# Patient Record
Sex: MALE | Race: WHITE
[De-identification: names, ages, dates, MRNs, and addresses within clinical notes are randomized per-mention and may not be internally consistent; named-entity substitution may affect disease eponyms.]

---

## 2022-10-13 ENCOUNTER — NURSE TRIAGE (OUTPATIENT)
Dept: OTHER | Facility: CLINIC | Age: 37
End: 2022-10-13

## 2022-10-13 NOTE — TELEPHONE ENCOUNTER
Location of patient: [address] [facility] [address] [facility] [address] solorzano from [facility] with [employer]. Subjective: Caller states \"I have a sensitive stomach. Starting yesterday I had some bleeding with bowel movements. \"     Current Symptoms: bright red blood on toilet tissue and in water    Soft stool    States went to urgent care last week for frequent urination - states \"all the tests were negative\" - was given doxycycline    Onset: 1 days ago;       Pain Severity: 2/10    Temperature:  denies    Denies - black or tarry stool / vomiting / rectal trauma     What has been tried: probiotics      Recommended disposition: See in Office Today    Patient has a new patient appointment scheduled for 10/21/22 and would like the appointment moved to today. Patient advised to follow up with an [facility] for current problem if appointment cannot be moved to today. Care advice provided, patient verbalizes understanding; denies any other questions or concerns; instructed to call back for any new or worsening symptoms. Patient/Caller agrees with recommended disposition; writer provided warm transfer to [facility] at [facility] for appointment scheduling    Attention Provider: Thank you for allowing me to participate in the care of your patient. The patient was connected to triage in response to information provided to the [facility]. Please do not respond through this encounter as the response is not directed to a shared pool.         Reason for Disposition   Patient wants to be seen    Protocols used: Rectal Bleeding-ADULT-[place]

## 2022-10-21 ENCOUNTER — OFFICE VISIT (OUTPATIENT)
Dept: FAMILY MEDICINE CLINIC | Age: 37
End: 2022-10-21
Payer: MEDICAID

## 2022-10-21 VITALS
SYSTOLIC BLOOD PRESSURE: 126 MMHG | HEIGHT: 69 IN | OXYGEN SATURATION: 100 % | DIASTOLIC BLOOD PRESSURE: 76 MMHG | TEMPERATURE: 98.4 F | BODY MASS INDEX: 24.88 KG/M2 | HEART RATE: 60 BPM | WEIGHT: 168 LBS

## 2022-10-21 DIAGNOSIS — G44.219 EPISODIC TENSION-TYPE HEADACHE, NOT INTRACTABLE: ICD-10-CM

## 2022-10-21 DIAGNOSIS — R35.0 URINARY FREQUENCY: Primary | ICD-10-CM

## 2022-10-21 DIAGNOSIS — K62.5 BRIGHT RED BLOOD PER RECTUM: ICD-10-CM

## 2022-10-21 DIAGNOSIS — D18.01 CHERRY ANGIOMA: ICD-10-CM

## 2022-10-21 DIAGNOSIS — R07.9 CHEST PAIN, UNSPECIFIED TYPE: ICD-10-CM

## 2022-10-21 PROCEDURE — 99203 OFFICE O/P NEW LOW 30 MIN: CPT

## 2022-10-21 NOTE — PROGRESS NOTES
Chief Complaint   Patient presents with    Establish Care    Physical    Labs     Visit Vitals  /76 (BP 1 Location: Left upper arm, BP Patient Position: Sitting, BP Cuff Size: Adult)   Pulse 60   Temp 98.4 °F (36.9 °C) (Temporal)   Ht 5' 9.29\" (1.76 m)   Wt 168 lb (76.2 kg)   SpO2 100%   BMI 24.60 kg/m²

## 2022-10-21 NOTE — PROGRESS NOTES
Wang White is an 44 y.o. male who presents for a new patient visit with multiple acute complaints. Patient reports urinary frequency over the past few weeks. He reports urination every couple of hours. He went to [facility] 2 weeks ago, UA, CBC, STI results were normal. His symptoms persisted, and he returned to [facility]. He was prescribed doxycycline after complaining of some associated scrotal pain. He reports moderate improvement in the last few days, but reports that he is still using the restroom every 3 hours. Denies dysuria, hematuria, fever, chills, flank pain, urgency, hesitancy. The patient is also reporting some increased diffuse skin itchiness as well over the past few weeks. He states that this has improved over the last few days. The patient reports an episode of bright red blood per rectum last week. Had another episode the day after. He reports blood present on the tissue paper after wiping. He denies rectal pain. He was evaluated at [facility] for these episodes, and he was given procto-foam. He has had no episodes since. He has called GI and has an appointment in Dec.     He reports intermittent mild headaches. These are left retro-orbital and resolve with sleep/rest. They are not present when waking in the morning. No change in quality in the headaches. The patient works at a desk during the day. The patient reports a rash described as discrete small red spots scattered over his skin. Denies itching. Lastly, the patient reports some intermittent substernal chest pains. These pains last anywhere from 1-5minutes. They are non-exertional and resolve spontaneously. He denies associated numbness, tingling, palpitations. He has been evaluated by two prior providers for the same with EKGs at that time reportedly normal.    Allergies - reviewed:   Not on File      Medications - reviewed:   No current outpatient medications on file.      No current facility-administered medications for this visit. Past Medical History - reviewed:  No past medical history on file. Past Surgical History - reviewed:   No past surgical history on file. Social History - reviewed:  Social History     Socioeconomic History    Marital status: SINGLE     Spouse name: Not on file    Number of children: Not on file    Years of education: Not on file    Highest education level: Not on file   Occupational History    Not on file   Tobacco Use    Smoking status: Never    Smokeless tobacco: Never   Vaping Use    Vaping Use: Not on file   Substance and Sexual Activity    Alcohol use: Yes     Alcohol/week: 2.0 standard drinks     Types: 1 Glasses of wine, 1 Cans of beer per week    Drug use: Never    Sexual activity: Yes     Partners: Female     Birth control/protection: None   Other Topics Concern    Not on file   Social History Narrative    Not on file     Social Determinants of Health     Financial Resource Strain: Not on file   Food Insecurity: Not on file   Transportation Needs: Not on file   Physical Activity: Not on file   Stress: Not on file   Social Connections: Not on file   Intimate Partner Violence: Not on file   Housing Stability: Not on file        ROS  Review of Systems   Constitutional:  Negative for chills, fever, malaise/fatigue and weight loss. Eyes:  Negative for blurred vision. Respiratory:  Negative for cough and shortness of breath. Cardiovascular:  Positive for chest pain. Negative for palpitations. Gastrointestinal:  Positive for blood in stool (resolved). Negative for abdominal pain, nausea and vomiting. Genitourinary:  Positive for frequency. Negative for dysuria, flank pain, hematuria and urgency. Skin:  Positive for rash. Neurological:  Positive for headaches. Negative for weakness. Endo/Heme/Allergies:  Negative for polydipsia.         Physical Exam  Visit Vitals  /76 (BP 1 Location: Left upper arm, BP Patient Position: Sitting, BP Cuff Size: Adult)   Pulse 60   Temp 98.4 °F (36.9 °C) (Temporal)   Ht 5' 9.29\" (1.76 m)   Wt 168 lb (76.2 kg)   SpO2 100%   BMI 24.60 kg/m²       Vital signs reviewed  General appearance - alert, well appearing, and in no distress  Eyes - pupils equal and reactive, extraocular eye movements intact  Mouth - mucous membranes moist, pharynx normal without lesions  Neck - supple, no significant adenopathy  Chest - no respiratory distress, speaking in full sentences  Heart - normal rate, regular rhythm, normal S1, S2, no murmurs, rubs, clicks or gallops  Neurological - alert, oriented, normal speech, no focal findings or movement disorder noted  Extremities - peripheral pulses normal, no pedal edema, no clubbing or cyanosis  Skin - scattered cherry angiomas to skin      Assessment/Plan  1. Urinary frequency: prior work-up negative for infection. Possible epididymitis s/p doxy treatment. No nocturnal symptoms, low suspicion for BPH.  - Discussed common bladder irritants including caffeine, alcohol.  - Discussed modifiable factors including moderating fluid intake    2. Bright red blood per rectum: acute, resolved. Likely secondary to hemorrhoids, anal fissure. - Monitor for recurrence  - has GI appointment scheduled  - Discussed return precautions including new or worsening symptoms    3. Cherry angiomas    4. Chest pain, unspecified type: Non-exertional with prior unremarkable work-up. Very low suspicion for cardiac etiology. May be musculoskeletal in etiology. - Continue to monitor    5. Episodic tension-type headache, not intractable: Likely associated with desk work  - Discussed neck exercises, stretches periodically  - may use OTC tylenol or ibuprofen as needed for prn relief. I have discussed the diagnosis with the patient and the intended plan as seen in the above orders. Patient verbalized understanding of the plan and agrees with the plan.  The patient has received an after-visit summary and questions were answered concerning future plans. I have discussed medication side effects and warnings with the patient as well. Informed patient to return to the office if new symptoms arise.         Campos Juaerz MD  Patient was seen and discussed with attending Dr. Aurelia Tony

## 2022-10-24 NOTE — PROGRESS NOTES
I saw and evaluated the patient, performing the key elements of the service. I discussed the findings, assessment and plan with the resident and agree with the resident's findings and plan as documented in the resident's note. All of the symptoms resolved, chest pain is not present today.

## 2022-11-22 ENCOUNTER — OFFICE VISIT (OUTPATIENT)
Dept: FAMILY MEDICINE CLINIC | Age: 37
End: 2022-11-22
Payer: MEDICAID

## 2022-11-22 VITALS
HEIGHT: 69 IN | SYSTOLIC BLOOD PRESSURE: 110 MMHG | HEART RATE: 55 BPM | OXYGEN SATURATION: 98 % | DIASTOLIC BLOOD PRESSURE: 65 MMHG | WEIGHT: 172 LBS | RESPIRATION RATE: 16 BRPM | BODY MASS INDEX: 25.48 KG/M2

## 2022-11-22 DIAGNOSIS — R35.0 URINARY FREQUENCY: ICD-10-CM

## 2022-11-22 DIAGNOSIS — L29.9 PRURITUS: Primary | ICD-10-CM

## 2022-11-22 DIAGNOSIS — K62.5 BRIGHT RED BLOOD PER RECTUM: ICD-10-CM

## 2022-11-22 PROCEDURE — 99213 OFFICE O/P EST LOW 20 MIN: CPT

## 2022-11-22 NOTE — PATIENT INSTRUCTIONS
- limit the length of hot showers. Pat yourself dry and trial some moisturizing ointments applied after patting yourself dry. - if the itching is keep you from sleep you may get relief with over the counter benadryl. Use this only at nights as it is sedating.

## 2022-11-22 NOTE — PROGRESS NOTES
Chief Complaint   Patient presents with    Follow-up     PT being seen to follow-up on urinary frequency; presents with improvements. Blood not present in stool      Visit Vitals  /65   Pulse (!) 55   Resp 16   Ht 5' 9.29\" (1.76 m)   Wt 172 lb (78 kg)   SpO2 98%   BMI 25.19 kg/m²     1. Have you been to the ER, urgent care clinic since your last visit? Hospitalized since your last visit? No    2. Have you seen or consulted any other health care providers outside of the [address] [place] [address] since your last visit? Include any pap smears or colon screening.  No

## 2024-02-05 ENCOUNTER — OFFICE VISIT (OUTPATIENT)
Age: 39
End: 2024-02-05
Payer: COMMERCIAL

## 2024-02-05 VITALS
SYSTOLIC BLOOD PRESSURE: 128 MMHG | OXYGEN SATURATION: 98 % | DIASTOLIC BLOOD PRESSURE: 84 MMHG | HEART RATE: 70 BPM | BODY MASS INDEX: 25.19 KG/M2 | WEIGHT: 172 LBS

## 2024-02-05 DIAGNOSIS — E66.3 OVERWEIGHT: ICD-10-CM

## 2024-02-05 DIAGNOSIS — Z00.00 ENCOUNTER FOR WELL ADULT EXAM WITHOUT ABNORMAL FINDINGS: ICD-10-CM

## 2024-02-05 DIAGNOSIS — K62.5 BRIGHT RED BLOOD PER RECTUM: Primary | ICD-10-CM

## 2024-02-05 DIAGNOSIS — Z80.42: ICD-10-CM

## 2024-02-05 DIAGNOSIS — Z13.1 SCREENING FOR DIABETES MELLITUS: ICD-10-CM

## 2024-02-05 DIAGNOSIS — Z13.220 SCREENING FOR LIPID DISORDERS: ICD-10-CM

## 2024-02-05 DIAGNOSIS — R35.0 URINARY FREQUENCY: ICD-10-CM

## 2024-02-05 DIAGNOSIS — K64.4 EXTERNAL HEMORRHOID: ICD-10-CM

## 2024-02-05 LAB
ALBUMIN SERPL-MCNC: 4.4 G/DL (ref 3.5–5)
ALBUMIN/GLOB SERPL: 1.4 (ref 1.1–2.2)
ALP SERPL-CCNC: 51 U/L (ref 45–117)
ALT SERPL-CCNC: 91 U/L (ref 12–78)
ANION GAP SERPL CALC-SCNC: 1 MMOL/L (ref 5–15)
APPEARANCE UR: ABNORMAL
AST SERPL-CCNC: 43 U/L (ref 15–37)
BACTERIA URNS QL MICRO: NEGATIVE /HPF
BILIRUB SERPL-MCNC: 0.8 MG/DL (ref 0.2–1)
BILIRUB UR QL: NEGATIVE
BUN SERPL-MCNC: 19 MG/DL (ref 6–20)
BUN/CREAT SERPL: 19 (ref 12–20)
CALCIUM SERPL-MCNC: 9.3 MG/DL (ref 8.5–10.1)
CHLORIDE SERPL-SCNC: 108 MMOL/L (ref 97–108)
CHOLEST SERPL-MCNC: 183 MG/DL
CO2 SERPL-SCNC: 30 MMOL/L (ref 21–32)
COLOR UR: ABNORMAL
CREAT SERPL-MCNC: 0.99 MG/DL (ref 0.7–1.3)
EPITH CASTS URNS QL MICRO: ABNORMAL /LPF
ERYTHROCYTE [DISTWIDTH] IN BLOOD BY AUTOMATED COUNT: 13.4 % (ref 11.5–14.5)
EST. AVERAGE GLUCOSE BLD GHB EST-MCNC: 111 MG/DL
GLOBULIN SER CALC-MCNC: 3.1 G/DL (ref 2–4)
GLUCOSE SERPL-MCNC: 105 MG/DL (ref 65–100)
GLUCOSE UR STRIP.AUTO-MCNC: NEGATIVE MG/DL
HBA1C MFR BLD: 5.5 % (ref 4–5.6)
HCT VFR BLD AUTO: 45.7 % (ref 36.6–50.3)
HDLC SERPL-MCNC: 72 MG/DL
HDLC SERPL: 2.5 (ref 0–5)
HGB BLD-MCNC: 14.6 G/DL (ref 12.1–17)
HGB UR QL STRIP: ABNORMAL
HYALINE CASTS URNS QL MICRO: ABNORMAL /LPF (ref 0–5)
KETONES UR QL STRIP.AUTO: NEGATIVE MG/DL
LDLC SERPL CALC-MCNC: 100.6 MG/DL (ref 0–100)
LEUKOCYTE ESTERASE UR QL STRIP.AUTO: NEGATIVE
MCH RBC QN AUTO: 27.2 PG (ref 26–34)
MCHC RBC AUTO-ENTMCNC: 31.9 G/DL (ref 30–36.5)
MCV RBC AUTO: 85.3 FL (ref 80–99)
NITRITE UR QL STRIP.AUTO: NEGATIVE
NRBC # BLD: 0 K/UL (ref 0–0.01)
NRBC BLD-RTO: 0 PER 100 WBC
PH UR STRIP: 5 (ref 5–8)
PLATELET # BLD AUTO: 205 K/UL (ref 150–400)
PMV BLD AUTO: 12.2 FL (ref 8.9–12.9)
POTASSIUM SERPL-SCNC: 4.2 MMOL/L (ref 3.5–5.1)
PROT SERPL-MCNC: 7.5 G/DL (ref 6.4–8.2)
PROT UR STRIP-MCNC: NEGATIVE MG/DL
RBC # BLD AUTO: 5.36 M/UL (ref 4.1–5.7)
RBC #/AREA URNS HPF: ABNORMAL /HPF (ref 0–5)
SODIUM SERPL-SCNC: 139 MMOL/L (ref 136–145)
SP GR UR REFRACTOMETRY: 1.02 (ref 1–1.03)
SPECIMEN HOLD: NORMAL
TRIGL SERPL-MCNC: 52 MG/DL
UROBILINOGEN UR QL STRIP.AUTO: 0.2 EU/DL (ref 0.2–1)
VLDLC SERPL CALC-MCNC: 10.4 MG/DL
WBC # BLD AUTO: 7.1 K/UL (ref 4.1–11.1)
WBC URNS QL MICRO: ABNORMAL /HPF (ref 0–4)

## 2024-02-05 PROCEDURE — 99213 OFFICE O/P EST LOW 20 MIN: CPT

## 2024-02-05 PROCEDURE — 99395 PREV VISIT EST AGE 18-39: CPT

## 2024-02-05 RX ORDER — LIDOCAINE HCL AND HYDROCORTISONE ACETATE 20; 20 MG/G; MG/G
1 CREAM RECTAL 2 TIMES DAILY PRN
Qty: 1 KIT | Refills: 0 | Status: SHIPPED | OUTPATIENT
Start: 2024-02-05

## 2024-02-05 SDOH — ECONOMIC STABILITY: HOUSING INSECURITY
IN THE LAST 12 MONTHS, WAS THERE A TIME WHEN YOU DID NOT HAVE A STEADY PLACE TO SLEEP OR SLEPT IN A SHELTER (INCLUDING NOW)?: NO

## 2024-02-05 SDOH — ECONOMIC STABILITY: FOOD INSECURITY: WITHIN THE PAST 12 MONTHS, YOU WORRIED THAT YOUR FOOD WOULD RUN OUT BEFORE YOU GOT MONEY TO BUY MORE.: NEVER TRUE

## 2024-02-05 SDOH — ECONOMIC STABILITY: INCOME INSECURITY: HOW HARD IS IT FOR YOU TO PAY FOR THE VERY BASICS LIKE FOOD, HOUSING, MEDICAL CARE, AND HEATING?: NOT HARD AT ALL

## 2024-02-05 SDOH — ECONOMIC STABILITY: FOOD INSECURITY: WITHIN THE PAST 12 MONTHS, THE FOOD YOU BOUGHT JUST DIDN'T LAST AND YOU DIDN'T HAVE MONEY TO GET MORE.: NEVER TRUE

## 2024-02-05 ASSESSMENT — PATIENT HEALTH QUESTIONNAIRE - PHQ9
SUM OF ALL RESPONSES TO PHQ QUESTIONS 1-9: 0
SUM OF ALL RESPONSES TO PHQ9 QUESTIONS 1 & 2: 0
SUM OF ALL RESPONSES TO PHQ QUESTIONS 1-9: 0
1. LITTLE INTEREST OR PLEASURE IN DOING THINGS: 0
2. FEELING DOWN, DEPRESSED OR HOPELESS: 0

## 2024-02-05 NOTE — PROGRESS NOTES
Well Adult Note  Name: Willie Pemberton Today’s Date: 2024   MRN: [id number] Sex: Male   Age: 38 y.o. Ethnicity: Non- / Non    : 1985 Race: White (non-)      Willie Pemberton is here for well adult exam.  History:  Bright red blood per rectum with h/o hemorrhoids  - has been using prep-H without relief  - present on the toilet tissue  - reports reports some rectal pain with bowel movements  - reports a rectal burning in the morning and while sitting. He describes this as an internal pain  - has been using a memory foam pad to sit on during work  - no constipation, diarrhea, or change in bowel habits  - typically has x2 soft bowel movements daily  - no weight loss, fever, chills, melena, nausea, vomiting, dysuria    Urinary frequency  - he continue to report urinary frequency throughout the day  - he does drink plenty of water as well caffeine  - no recurrence of dysuria    HM  - patient exercises routine  - reports varied, healthy diet    Father diagnosed with prostate cancer at age 64      Review of Systems   Constitutional:  Negative for activity change, appetite change, chills, fatigue and fever.   Respiratory:  Negative for cough and shortness of breath.    Cardiovascular:  Negative for chest pain and palpitations.   Gastrointestinal:  Positive for blood in stool and rectal pain. Negative for abdominal pain, diarrhea, nausea and vomiting.   Endocrine: Negative for polydipsia.   Genitourinary:  Positive for frequency. Negative for dysuria.   Allergic/Immunologic: Negative for immunocompromised state.   Neurological:  Negative for weakness and numbness.       No Known Allergies      Prior to Visit Medications    Medication Sig Taking? Authorizing Provider   Lidocaine-Hydrocortisone Ace (JANINA-HILL) 2-2 % KIT Place 1 each rectally 2 times daily as needed (pain, itching) . Do not use for longer than x1 week without taking a two day break from use. Yes Zack Craig MD       History

## 2024-02-05 NOTE — PROGRESS NOTES
Chief Complaint   Patient presents with    Annual Exam     Vitals:    02/05/24 0827   BP: 128/84   Pulse: 70   SpO2: 98%

## 2024-02-06 PROBLEM — Z80.42: Status: ACTIVE | Noted: 2024-02-06

## 2024-02-06 PROBLEM — K64.4 EXTERNAL HEMORRHOID: Status: ACTIVE | Noted: 2024-02-06

## 2024-02-06 ASSESSMENT — ENCOUNTER SYMPTOMS
ABDOMINAL PAIN: 0
NAUSEA: 0
RECTAL PAIN: 1
COUGH: 0
BLOOD IN STOOL: 1
DIARRHEA: 0
VOMITING: 0
SHORTNESS OF BREATH: 0

## 2024-02-06 NOTE — PROGRESS NOTES
Well Adult Note  Name: Willie Pemberton Today’s Date: 2024   MRN: [id number] Sex: Male   Age: 38 y.o. Ethnicity: Non- / Non    : 1985 Race: White (non-)      Willie Pemberton is here for well adult exam.  History:  ***    Review of Systems    No Known Allergies      Prior to Visit Medications    Medication Sig Taking? Authorizing Provider   Lidocaine-Hydrocortisone Ace (JANINA-HILL) 2-2 % KIT Place 1 each rectally 2 times daily as needed (pain, itching) . Do not use for longer than x1 week without taking a two day break from use. Yes Zack Craig MD       History reviewed. No pertinent past medical history.    History reviewed. No pertinent surgical history.      Family History   Problem Relation Age of Onset   • Prostate Cancer Father        Social History     Tobacco Use   • Smoking status: Never   • Smokeless tobacco: Never   Substance Use Topics   • Alcohol use: Yes     Alcohol/week: 2.0 standard drinks of alcohol     Types: 1 Glasses of wine, 1 Cans of beer per week   • Drug use: Never       Objective     Vital Signs  /84 (Site: Right Upper Arm, Position: Sitting, Cuff Size: Medium Adult)   Pulse 70   Wt 78 kg (172 lb)   SpO2 98%   BMI 25.19 kg/m²   Wt Readings from Last 3 Encounters:   24 78 kg (172 lb)   22 78 kg (172 lb)   10/21/22 76.2 kg (168 lb)       Waist Circumference  There were no vitals filed for this visit.    Physical Exam    Assessment   Plan   1. Bright red blood per rectum  -     CBC; Future  -     ND OFFICE/OUTPATIENT ESTABLISHED LOW MDM 20-29 MIN  2. External hemorrhoid  -     CBC; Future  -     Lidocaine-Hydrocortisone Ace (JANINA-HILL) 2-2 % KIT; Place 1 each rectally 2 times daily as needed (pain, itching) . Do not use for longer than x1 week without taking a two day break from use., Disp-1 kit, R-0Normal  -     ND OFFICE/OUTPATIENT ESTABLISHED LOW MDM 20-29 MIN  3. Urinary frequency  -     Hemoglobin A1C; Future  -     Urinalysis with

## 2024-02-07 ENCOUNTER — TELEPHONE (OUTPATIENT)
Age: 39
End: 2024-02-07

## 2024-02-07 DIAGNOSIS — R74.8 ELEVATED LIVER ENZYMES: Primary | ICD-10-CM

## 2024-02-07 LAB
PSA SERPL-MCNC: 0.4 NG/ML (ref 0–4)
REFLEX CRITERIA: NORMAL

## 2024-02-07 NOTE — PROGRESS NOTES
Order placed for repeat CMP to be completed at lab appointment for elevated liver enzymes.    Zack Craig MD

## 2024-02-07 NOTE — TELEPHONE ENCOUNTER
Oralia Keating, Pharmacist for [facility], called on behalf of pt. She stated that the cost of Lidocaine-Hydrocortisone Ace (JANINA-HILL) 2-2 % KIT is over $200, which the pt stated was too much. She suggested Hydrocortisone rectal cream 2.5 %, which is $10 with pt's insurance.    Thank you

## 2024-02-09 NOTE — TELEPHONE ENCOUNTER
Called and gave verbal order with same directions to pharmacist. Positive read back performed by pharmacist.

## 2024-02-09 NOTE — TELEPHONE ENCOUNTER
Spoke with patient who confirmed name and .    Patient notified that updated prescription has been called in to pharmacy.    Patient agreed and voiced understanding.

## 2024-02-09 NOTE — TELEPHONE ENCOUNTER
Pt called to check the status of alternative medication. Due to Peace's absence, can you please assist with this?    Thank you

## 2024-03-03 NOTE — PROGRESS NOTES
Subjective  Earlean Single is an 44 y.o. male who presents for follow-up. Urinary frequency:  - reports improved since last visit. - no dysuria, hematuria, abdominal pain, fever, chills  - No intervention or medication changes per patient    BRBPR:  - Blood in stool resolved. - Patient feels that his symptoms were likely secondary to hemorrhoids.  - He reports some intermittent mild pain preceding bowel movement. This pain is relieved with Preparation H use. - The patient has an upcoming GI appointment scheduled in December. Generalized pruritis:   - Itching to skin. Onset over the last few months. - denies rash  - denies new soap, detergent, or environmental exposures    Allergies - reviewed:   Not on File      Medications - reviewed:   No current outpatient medications on file. No current facility-administered medications for this visit. Past Medical History - reviewed:  No past medical history on file. Past Surgical History - reviewed:   No past surgical history on file. Social History - reviewed:  Social History     Socioeconomic History    Marital status: SINGLE     Spouse name: Not on file    Number of children: Not on file    Years of education: Not on file    Highest education level: Not on file   Occupational History    Not on file   Tobacco Use    Smoking status: Never    Smokeless tobacco: Never   Vaping Use    Vaping Use: Not on file   Substance and Sexual Activity    Alcohol use:  Yes     Alcohol/week: 2.0 standard drinks     Types: 1 Glasses of wine, 1 Cans of beer per week    Drug use: Never    Sexual activity: Yes     Partners: Female     Birth control/protection: None   Other Topics Concern    Not on file   Social History Narrative    Not on file     Social Determinants of Health     Financial Resource Strain: Not on file   Food Insecurity: Not on file   Transportation Needs: Not on file   Physical Activity: Not on file   Stress: Not on file   Social Connections: Not on file   Intimate Partner Violence: Not on file   Housing Stability: Not on file        ROS  Review of Systems   Constitutional:  Negative for chills, fever and malaise/fatigue. Respiratory:  Negative for cough and shortness of breath. Gastrointestinal:  Positive for blood in stool (resolved). Negative for abdominal pain, melena, nausea and vomiting. Genitourinary:  Positive for frequency (resolved). Negative for dysuria. Skin:  Positive for itching. Negative for rash. Neurological:  Negative for weakness and headaches. Physical Exam  Visit Vitals  /65   Pulse (!) 55   Resp 16   Ht 5' 9.29\" (1.76 m)   Wt 172 lb (78 kg)   SpO2 98%   BMI 25.19 kg/m²       Vital signs reviewed  General appearance - alert, well appearing, and in no distress  Eyes - pupils equal and reactive, extraocular eye movements intact  Ears - bilateral TM's and external ear canals normal  Nose - normal and patent, no erythema, discharge or polyps  Mouth - mucous membranes moist, pharynx normal without lesions  Neck - supple, no significant adenopathy  Chest - clear to auscultation, no wheezes, rales or rhonchi, symmetric air entry  Heart - normal rate, regular rhythm, normal S1, S2, no murmurs, rubs, clicks or gallops  Abdomen - soft, nontender, nondistended, no masses or organomegaly  Neurological - alert, oriented, normal speech, no focal findings or movement disorder noted  Extremities - peripheral pulses normal, no pedal edema, no clubbing or cyanosis  Skin - scattered cherry angiomas      Assessment/Plan  1. Pruritus: chronic, stable. No evidence of rash on exam. Potential etiology includes environmental allergen vs dry skin  - recommended limiting exposure to hot water. Apply hydrating/barrier cream or ointment to problem areas after showering.  - may trial benadryl at night for worsening symptoms. 2. Bright red blood per rectum: intermittent, resolved. Likely secondary to hemorrhoids.   - GI follow-up scheduled for further evaluation    3. Urinary frequency: resolved  - return to clinic if symptoms return. Follow-up and Dispositions    Return in about 1 year (around 11/22/2023), or if symptoms worsen or fail to improve, for annual physical.           I have discussed the diagnosis with the patient and the intended plan as seen in the above orders. Patient verbalized understanding of the plan and agrees with the plan. The patient has received an after-visit summary and questions were answered concerning future plans. I have discussed medication side effects and warnings with the patient as well. Informed patient to return to the office if new symptoms arise.         Chio Durant MD  Patient was seen and discussed with attending Dr. Metz Reasons [Feeling Fatigued] : feeling fatigued [Fever] : no fever [Earache] : no earache [Chills] : no chills [SOB] : no shortness of breath [Discharge From Ears] : no discharge from the ears [Sore Throat] : no sore throat [Leg Claudication] : no intermittent leg claudication [Cough] : no cough [Wheezing] : no wheezing [Abdominal Pain] : no abdominal pain [Change in Appetite] : no change in appetite [Nausea] : no nausea [Constipation] : no constipation [Change In The Stool] : no change in stool [FreeTextEntry3] : Wears glasses

## 2024-03-04 ENCOUNTER — NURSE ONLY (OUTPATIENT)
Age: 39
End: 2024-03-04

## 2024-03-04 DIAGNOSIS — R74.8 ELEVATED LIVER ENZYMES: ICD-10-CM

## 2024-03-04 LAB
ALBUMIN SERPL-MCNC: 4 G/DL (ref 3.5–5)
ALBUMIN/GLOB SERPL: 1.3 (ref 1.1–2.2)
ALP SERPL-CCNC: 47 U/L (ref 45–117)
ALT SERPL-CCNC: 40 U/L (ref 12–78)
ANION GAP SERPL CALC-SCNC: 4 MMOL/L (ref 5–15)
AST SERPL-CCNC: 22 U/L (ref 15–37)
BILIRUB SERPL-MCNC: 0.8 MG/DL (ref 0.2–1)
BUN SERPL-MCNC: 16 MG/DL (ref 6–20)
BUN/CREAT SERPL: 12 (ref 12–20)
CALCIUM SERPL-MCNC: 9.2 MG/DL (ref 8.5–10.1)
CHLORIDE SERPL-SCNC: 106 MMOL/L (ref 97–108)
CO2 SERPL-SCNC: 30 MMOL/L (ref 21–32)
CREAT SERPL-MCNC: 1.3 MG/DL (ref 0.7–1.3)
GLOBULIN SER CALC-MCNC: 3.1 G/DL (ref 2–4)
GLUCOSE SERPL-MCNC: 89 MG/DL (ref 65–100)
POTASSIUM SERPL-SCNC: 4.4 MMOL/L (ref 3.5–5.1)
PROT SERPL-MCNC: 7.1 G/DL (ref 6.4–8.2)
SODIUM SERPL-SCNC: 140 MMOL/L (ref 136–145)

## 2025-05-27 ENCOUNTER — TELEPHONE (OUTPATIENT)
Age: 40
End: 2025-05-27

## 2025-05-27 DIAGNOSIS — R74.8 ELEVATED LIVER ENZYMES: Primary | ICD-10-CM

## 2025-05-27 DIAGNOSIS — Z13.1 SCREENING FOR DIABETES MELLITUS: ICD-10-CM

## 2025-05-27 DIAGNOSIS — Z13.220 SCREENING FOR LIPID DISORDERS: ICD-10-CM

## 2025-05-27 DIAGNOSIS — E78.00 ELEVATED LDL CHOLESTEROL LEVEL: ICD-10-CM

## 2025-05-27 DIAGNOSIS — E66.3 OVERWEIGHT: ICD-10-CM

## 2025-05-27 NOTE — TELEPHONE ENCOUNTER
Pt is scheduled on 5/29 for a physical but would like to complete labs, prior to appt. Please advise if you will upload lab orders.

## 2025-05-28 NOTE — TELEPHONE ENCOUNTER
I called and spoke with the patient, he requested an early lab appointment for tomorrow and he wanted to keep his afternoon appointment with the Doctor.  Lab appt scheduled, understands to be fasting 8 hours prior to his appointment.

## 2025-05-29 ENCOUNTER — LAB (OUTPATIENT)
Age: 40
End: 2025-05-29

## 2025-05-29 ENCOUNTER — OFFICE VISIT (OUTPATIENT)
Age: 40
End: 2025-05-29
Payer: COMMERCIAL

## 2025-05-29 VITALS
OXYGEN SATURATION: 95 % | HEART RATE: 60 BPM | HEIGHT: 69 IN | WEIGHT: 179 LBS | BODY MASS INDEX: 26.51 KG/M2 | SYSTOLIC BLOOD PRESSURE: 104 MMHG | TEMPERATURE: 98.7 F | DIASTOLIC BLOOD PRESSURE: 50 MMHG

## 2025-05-29 DIAGNOSIS — E78.00 ELEVATED LDL CHOLESTEROL LEVEL: ICD-10-CM

## 2025-05-29 DIAGNOSIS — E66.3 OVERWEIGHT: ICD-10-CM

## 2025-05-29 DIAGNOSIS — R74.8 ELEVATED LIVER ENZYMES: ICD-10-CM

## 2025-05-29 DIAGNOSIS — Z13.220 SCREENING FOR LIPID DISORDERS: ICD-10-CM

## 2025-05-29 DIAGNOSIS — Z13.1 SCREENING FOR DIABETES MELLITUS: ICD-10-CM

## 2025-05-29 DIAGNOSIS — Z00.00 ENCOUNTER FOR WELL ADULT EXAM WITHOUT ABNORMAL FINDINGS: Primary | ICD-10-CM

## 2025-05-29 PROCEDURE — 99396 PREV VISIT EST AGE 40-64: CPT

## 2025-05-29 SDOH — ECONOMIC STABILITY: FOOD INSECURITY: WITHIN THE PAST 12 MONTHS, THE FOOD YOU BOUGHT JUST DIDN'T LAST AND YOU DIDN'T HAVE MONEY TO GET MORE.: NEVER TRUE

## 2025-05-29 SDOH — ECONOMIC STABILITY: FOOD INSECURITY: WITHIN THE PAST 12 MONTHS, YOU WORRIED THAT YOUR FOOD WOULD RUN OUT BEFORE YOU GOT MONEY TO BUY MORE.: NEVER TRUE

## 2025-05-29 ASSESSMENT — PATIENT HEALTH QUESTIONNAIRE - PHQ9
SUM OF ALL RESPONSES TO PHQ QUESTIONS 1-9: 0
2. FEELING DOWN, DEPRESSED OR HOPELESS: NOT AT ALL
1. LITTLE INTEREST OR PLEASURE IN DOING THINGS: NOT AT ALL

## 2025-05-29 ASSESSMENT — ENCOUNTER SYMPTOMS
ABDOMINAL PAIN: 0
COUGH: 0
SHORTNESS OF BREATH: 0

## 2025-05-29 NOTE — PATIENT INSTRUCTIONS
Well Visit, Ages 18 to 65: Care Instructions  Well visits can help you stay healthy. Your doctor has checked your overall health and may have suggested ways to take good care of yourself. Your doctor also may have recommended tests. You can help prevent illness with healthy eating, good sleep, vaccinations, regular exercise, and other steps.    Get the tests that you and your doctor decide on. Depending on your age and risks, examples might include screening for diabetes; hepatitis C; HIV; and cervical, breast, lung, and colon cancer. Screening helps find diseases before any symptoms appear.   Eat healthy foods. Choose fruits, vegetables, whole grains, lean protein, and low-fat dairy foods. Limit saturated fat and reduce salt.     Limit alcohol. Men should have no more than 2 drinks a day. Women should have no more than 1. For some people, no alcohol is the best choice.   Exercise. Get at least 30 minutes of exercise on most days of the week. Walking can be a good choice.     Reach and stay at your healthy weight. This will lower your risk for many health problems.   Take care of your mental health. Try to stay connected with friends, family, and community, and find ways to manage stress.     If you're feeling depressed or hopeless, talk to someone. A counselor can help. If you don't have a counselor, talk to your doctor.   Talk to your doctor if you think you may have a problem with alcohol or drug use. This includes prescription medicines, marijuana, and other drugs.     Avoid tobacco and nicotine: Don't smoke, vape, or chew. If you need help quitting, talk to your doctor.   Practice safer sex. Getting tested, using condoms or dental dams, and limiting sex partners can help prevent STIs.     Use birth control if it's important to you to prevent pregnancy. Talk with your doctor about your choices and what might be best for you.   Prevent problems where you can. Protect your skin from too much sun, wash your  #11 Blade

## 2025-05-29 NOTE — PROGRESS NOTES
Willie Pemberton is a 39 y.o. male      No chief complaint on file.      \"Have you been to the ER, urgent care clinic since your last visit?  Hospitalized since your last visit?\"    NO    “Have you seen or consulted any other health care providers outside of [facility] since your last visit?”    NO          Click Here for Release of Records Request    Vitals:    05/29/25 1429   BP: (!) 104/50   BP Site: Right Upper Arm   Patient Position: Sitting   BP Cuff Size: Large Adult   Pulse: 60   Temp: 98.7 °F (37.1 °C)   TempSrc: Oral   SpO2: 95%   Weight: 81.2 kg (179 lb)   Height: 1.76 m (5' 9.29\")           Medication Reconciliation Completed, changes notes. Please Update medication list.

## 2025-05-29 NOTE — PROGRESS NOTES
Well Adult Note  Name: Willie Pemberton Today’s Date: 2025   MRN: [id number] Sex: Male   Age: 39 y.o. Ethnicity: Non- / Non    : 1985 Race: White (non-)      Willie Pemberton is here for a well adult exam.       Assessment & Plan   Encounter for well adult exam without abnormal findings  Screening for diabetes mellitus  Screening for lipid disorders  Overweight  Elevated LDL cholesterol level    Routine labs ordered previously and obtained this morning: CBC, BMP, lipid panel, A1c.        Return in 1 year (on 2026) for CPE (Physical Exam).       Subjective   History:  No specific issues or concerns  Exercise: routinely, has been doing more over the past week. Three days a week. Mix of running and weights  Lives with wife and daughter: feels safe at home    No acute concerns    During review of symptoms reporting prior episode of right sided chest pain  - occurred a few months ago  - this resolved spontaneously  - felt like a pinching sensation  - felt like gas or reflux  - the pain was not exertional. He denies exertional chest pain or SOB  - denies numbness, weakness, tingling, diaphoresis  - no significant Fhx of CAD      Review of Systems   Constitutional:  Negative for activity change, appetite change, chills, fatigue and fever.   HENT:  Negative for ear pain.    Eyes:  Negative for visual disturbance.   Respiratory:  Negative for cough and shortness of breath.    Cardiovascular:  Negative for chest pain, palpitations and leg swelling.   Gastrointestinal:  Negative for abdominal pain.   Skin:  Negative for rash and wound.   Allergic/Immunologic: Negative for immunocompromised state.       No Known Allergies  Prior to Visit Medications    Medication Sig Taking? Authorizing Provider   Lidocaine-Hydrocortisone Ace ([facility]) 2-2 % KIT Place 1 each rectally 2 times daily as needed (pain, itching) . Do not use for longer than x1 week without taking a two day break from

## 2025-05-30 LAB
ALBUMIN SERPL-MCNC: 4 G/DL (ref 3.5–5)
ALBUMIN/GLOB SERPL: 1.3 (ref 1.1–2.2)
ALP SERPL-CCNC: 55 U/L (ref 45–117)
ALT SERPL-CCNC: 39 U/L (ref 12–78)
ANION GAP SERPL CALC-SCNC: 3 MMOL/L (ref 2–12)
AST SERPL-CCNC: 20 U/L (ref 15–37)
BILIRUB SERPL-MCNC: 1 MG/DL (ref 0.2–1)
BUN SERPL-MCNC: 19 MG/DL (ref 6–20)
BUN/CREAT SERPL: 17 (ref 12–20)
CALCIUM SERPL-MCNC: 9.4 MG/DL (ref 8.5–10.1)
CHLORIDE SERPL-SCNC: 107 MMOL/L (ref 97–108)
CHOLEST SERPL-MCNC: 155 MG/DL
CO2 SERPL-SCNC: 30 MMOL/L (ref 21–32)
CREAT SERPL-MCNC: 1.14 MG/DL (ref 0.7–1.3)
ERYTHROCYTE [DISTWIDTH] IN BLOOD BY AUTOMATED COUNT: 12.5 % (ref 11.5–14.5)
EST. AVERAGE GLUCOSE BLD GHB EST-MCNC: 108 MG/DL
GLOBULIN SER CALC-MCNC: 3.2 G/DL (ref 2–4)
GLUCOSE SERPL-MCNC: 94 MG/DL (ref 65–100)
HBA1C MFR BLD: 5.4 % (ref 4–5.6)
HCT VFR BLD AUTO: 44.7 % (ref 36.6–50.3)
HDLC SERPL-MCNC: 55 MG/DL
HDLC SERPL: 2.8 (ref 0–5)
HGB BLD-MCNC: 14.4 G/DL (ref 12.1–17)
LDLC SERPL CALC-MCNC: 88.8 MG/DL (ref 0–100)
MCH RBC QN AUTO: 27.5 PG (ref 26–34)
MCHC RBC AUTO-ENTMCNC: 32.2 G/DL (ref 30–36.5)
MCV RBC AUTO: 85.3 FL (ref 80–99)
NRBC # BLD: 0 K/UL (ref 0–0.01)
NRBC BLD-RTO: 0 PER 100 WBC
PLATELET # BLD AUTO: 194 K/UL (ref 150–400)
PMV BLD AUTO: 12.4 FL (ref 8.9–12.9)
POTASSIUM SERPL-SCNC: 4.4 MMOL/L (ref 3.5–5.1)
PROT SERPL-MCNC: 7.2 G/DL (ref 6.4–8.2)
RBC # BLD AUTO: 5.24 M/UL (ref 4.1–5.7)
SODIUM SERPL-SCNC: 140 MMOL/L (ref 136–145)
TRIGL SERPL-MCNC: 56 MG/DL
VLDLC SERPL CALC-MCNC: 11.2 MG/DL
WBC # BLD AUTO: 5.9 K/UL (ref 4.1–11.1)

## 2025-05-30 NOTE — PROGRESS NOTES
[facility] Attending Attestation: While the patient was in clinic or immediately following the patient leaving the clinic, I reviewed the patient's medical history, the resident's findings on physical examination, and the patient's diagnosis and treatment plan with the resident and agree with the documentation in the note.     Nicolas Vicente MD

## 2025-06-02 ENCOUNTER — RESULTS FOLLOW-UP (OUTPATIENT)
Age: 40
End: 2025-06-02